# Patient Record
Sex: MALE | URBAN - METROPOLITAN AREA
[De-identification: names, ages, dates, MRNs, and addresses within clinical notes are randomized per-mention and may not be internally consistent; named-entity substitution may affect disease eponyms.]

---

## 2018-02-13 ENCOUNTER — IMPORTED ENCOUNTER (OUTPATIENT)
Dept: URBAN - METROPOLITAN AREA CLINIC 9 | Facility: CLINIC | Age: 68
End: 2018-02-13

## 2019-02-12 ENCOUNTER — IMPORTED ENCOUNTER (OUTPATIENT)
Dept: URBAN - METROPOLITAN AREA CLINIC 9 | Facility: CLINIC | Age: 69
End: 2019-02-12

## 2019-02-18 ENCOUNTER — IMPORTED ENCOUNTER (OUTPATIENT)
Dept: URBAN - METROPOLITAN AREA CLINIC 9 | Facility: CLINIC | Age: 69
End: 2019-02-18

## 2019-02-28 ENCOUNTER — IMPORTED ENCOUNTER (OUTPATIENT)
Dept: URBAN - METROPOLITAN AREA CLINIC 9 | Facility: CLINIC | Age: 69
End: 2019-02-28

## 2019-03-14 ENCOUNTER — IMPORTED ENCOUNTER (OUTPATIENT)
Dept: URBAN - METROPOLITAN AREA CLINIC 9 | Facility: CLINIC | Age: 69
End: 2019-03-14

## 2019-03-14 PROBLEM — Z96.1: Noted: 2019-03-14

## 2020-09-14 NOTE — PATIENT DISCUSSION
Surgery Counseling: I have discussed the option of scheduling surgery versus following, as well as the risks, benefits and alternatives of cataract surgery with the patient. It was explained that the surgery is medically indicated at this time, and it can be performed at the patient's option as delaying will cause no further deterioration, therefore there is no rush and there is no harm in waiting to have surgery. It was also explained that there is no guarantee that removing the cataract will improve their vision. The patient understands and desires to proceed with cataract surgery with the implantation of an intraocular lens to improve vision for ____reading and driving at night_____. I have given the patient the prescribed regimen of the all-in-one drop to use before and after cataract surgery. They have elected to use the all-in-one option of Pred/Gati/Brom(prednisolone acetate,gatifloxacin,and bromfenac. Patient to administer as directed.

## 2020-09-14 NOTE — PATIENT DISCUSSION
CATARACTS, OU - VISUALLY SIGNIFICANT. SCHEDULE _OD_ FIRST THEN LATER IN __OS  DISCUSSED OPTION OF _STANDARD OU__VS ___PANOPTIX OU__. PATIENT UNDERSTANDS AND DESIRES _STANDARD OU_.

## 2020-10-02 NOTE — PATIENT DISCUSSION
Post-Op Instructions: The patient was instructed in the proper use of post-operative eye drop: omni in the surgical eye 3 times per day for 3 weeks, then discontinue. Call back instructions, retinal detachment and endophthalmitis precautions given.

## 2020-10-13 NOTE — PATIENT DISCUSSION
Pre-Op 2nd Eye Counseling: The patient has noticed an improvement in their visual symptoms in the operative eye. The patient complains of decreased vision in the fellow eye when __driving__. It was explained to the patient that the decision to proceed with cataract surgery in the fellow eye is entirely a separate decision from the surgical eye. All of the same risks, benefits and alternatives are reviewed with the patient again. The patient does feel the vision in the non-operative eye is limiting their daily activities and elects to proceed with cataract surgery in the _left_ eye. . I have given the patient the prescribed regimen of  drops to use before and after cataract surgery. Patient to administer as directed.

## 2020-10-13 NOTE — PATIENT DISCUSSION
S/P PE IOL, _od_. DOING WELL. CONTINUE PRED-GATI-BROM IN THE SURGICAL EYE  FOR A TOTAL OF 3 WEEKS USE THEN DISCONTINUE. PATIENT DESIRES _STANDARD__IOL FOR 2ND EYE. SCHEDULE CATARACT SURGERY.

## 2021-10-16 ASSESSMENT — TONOMETRY
OD_IOP_MMHG: 12
OS_IOP_MMHG: 18
OS_IOP_MMHG: 10
OS_IOP_MMHG: 14
OD_IOP_MMHG: 11
OS_IOP_MMHG: 14
OD_IOP_MMHG: 21
OS_IOP_MMHG: 18
OD_IOP_MMHG: 14
OD_IOP_MMHG: 14

## 2021-10-16 ASSESSMENT — KERATOMETRY
OS_AXISANGLE_DEGREES: 180
OS_K1POWER_DIOPTERS: 41.5
OD_K2POWER_DIOPTERS: 41.5
OS_AXISANGLE2_DEGREES: 118
OD_K2POWER_DIOPTERS: 41.25
OS_AXISANGLE2_DEGREES: 90
OS_K2POWER_DIOPTERS: 41.75
OS_AXISANGLE_DEGREES: 28
OD_AXISANGLE_DEGREES: 124
OD_K1POWER_DIOPTERS: 40.75
OD_AXISANGLE2_DEGREES: 39
OS_K1POWER_DIOPTERS: 41.25
OD_AXISANGLE2_DEGREES: 34
OD_AXISANGLE_DEGREES: 129
OS_K2POWER_DIOPTERS: 41.5
OD_K1POWER_DIOPTERS: 40.75

## 2021-10-16 ASSESSMENT — VISUAL ACUITY
OD_SC: 20/70 - SN
OS_CC: 20/50 SN
OS_SC: 20/400 SN
OS_SC: CF 2FT SN
OS_SC: 20/25 ET
OS_SC: CF 2FT SN
OD_CC: 20/30 SN
OD_PH: 20/40 SN
OD_SC: 20/70 SN
OS_PH: 20/60 SN
OD_CC: 20/20 SN
OD_SC: 20/25 ET
OD_SC: 20/60 SN
OD_CC: 20/30 SN
OS_SC: 20/40 SN
OD_PH: 20/30 -2 SN
OS_CC: 20/40 SN

## 2023-11-22 ENCOUNTER — NEW PATIENT (OUTPATIENT)
Dept: URBAN - METROPOLITAN AREA CLINIC 16 | Facility: CLINIC | Age: 73
End: 2023-11-22

## 2023-11-22 DIAGNOSIS — Z96.1: ICD-10-CM

## 2023-11-22 DIAGNOSIS — H26.491: ICD-10-CM

## 2023-11-22 PROCEDURE — 92015 DETERMINE REFRACTIVE STATE: CPT

## 2023-11-22 PROCEDURE — 99204 OFFICE O/P NEW MOD 45 MIN: CPT

## 2023-11-22 ASSESSMENT — KERATOMETRY
OD_AXISANGLE2_DEGREES: 85
OS_AXISANGLE2_DEGREES: 134
OS_K2POWER_DIOPTERS: 41.75
OS_AXISANGLE_DEGREES: 44
OD_AXISANGLE_DEGREES: 175
OD_K2POWER_DIOPTERS: 41.50
OD_K1POWER_DIOPTERS: 41.00
OS_K1POWER_DIOPTERS: 41.25

## 2023-11-22 ASSESSMENT — VISUAL ACUITY
OD_BCVA: 20/25
OD_SC: 20/50
OS_BCVA: 20/20
OS_SC: 20/25
OD_PH: 20/30-2

## 2023-11-22 ASSESSMENT — TONOMETRY
OD_IOP_MMHG: 16
OS_IOP_MMHG: 16

## 2023-12-07 ENCOUNTER — ESTABLISHED PATIENT (OUTPATIENT)
Dept: URBAN - METROPOLITAN AREA CLINIC 14 | Facility: CLINIC | Age: 73
End: 2023-12-07

## 2023-12-07 DIAGNOSIS — H26.491: ICD-10-CM

## 2023-12-07 PROCEDURE — 99214 OFFICE O/P EST MOD 30 MIN: CPT

## 2023-12-07 ASSESSMENT — KERATOMETRY
OD_K2POWER_DIOPTERS: 41.50
OS_K2POWER_DIOPTERS: 41.75
OS_K1POWER_DIOPTERS: 41.25
OD_AXISANGLE_DEGREES: 175
OS_AXISANGLE_DEGREES: 44
OS_AXISANGLE2_DEGREES: 134
OD_K1POWER_DIOPTERS: 41.00
OD_AXISANGLE2_DEGREES: 85

## 2023-12-07 ASSESSMENT — VISUAL ACUITY
OD_PH: 20/30-2
OD_BCVA: 20/25
OS_BCVA: 20/20
OD_SC: 20/50
OS_SC: 20/25

## 2023-12-07 ASSESSMENT — TONOMETRY
OS_IOP_MMHG: 16
OD_IOP_MMHG: 15

## 2023-12-28 ENCOUNTER — FOLLOW UP (OUTPATIENT)
Facility: LOCATION | Age: 73
End: 2023-12-28

## 2023-12-28 ASSESSMENT — VISUAL ACUITY
OS_BCVA: 20/20
OS_SC: 20/40
OD_SC: 20/25
OS_PH: 20/25
OD_BCVA: 20/20

## 2023-12-28 ASSESSMENT — KERATOMETRY
OD_K1POWER_DIOPTERS: 41.00
OS_AXISANGLE_DEGREES: 44
OS_K1POWER_DIOPTERS: 41.25
OS_AXISANGLE2_DEGREES: 134
OD_K2POWER_DIOPTERS: 41.50
OD_AXISANGLE2_DEGREES: 85
OS_K2POWER_DIOPTERS: 41.75
OD_AXISANGLE_DEGREES: 175

## 2023-12-28 ASSESSMENT — TONOMETRY
OS_IOP_MMHG: 15
OD_IOP_MMHG: 14

## 2024-01-02 ENCOUNTER — ESTABLISHED PATIENT (OUTPATIENT)
Facility: LOCATION | Age: 74
End: 2024-01-02

## 2024-01-02 DIAGNOSIS — H26.492: ICD-10-CM

## 2024-01-02 PROCEDURE — 92012 INTRM OPH EXAM EST PATIENT: CPT

## 2024-01-02 ASSESSMENT — TONOMETRY
OD_IOP_MMHG: 19
OS_IOP_MMHG: 17

## 2024-01-02 ASSESSMENT — KERATOMETRY
OS_AXISANGLE_DEGREES: 44
OS_AXISANGLE2_DEGREES: 134
OS_K1POWER_DIOPTERS: 41.25
OD_K2POWER_DIOPTERS: 41.50
OD_AXISANGLE_DEGREES: 175
OD_AXISANGLE2_DEGREES: 85
OS_K2POWER_DIOPTERS: 41.75
OD_K1POWER_DIOPTERS: 41.00

## 2024-01-02 ASSESSMENT — VISUAL ACUITY
OS_PH: 20/30
OD_SC: 20/20
OS_SC: 20/40-1

## 2024-02-14 ENCOUNTER — POST-OP (OUTPATIENT)
Facility: LOCATION | Age: 74
End: 2024-02-14

## 2024-02-14 DIAGNOSIS — Z98.890: ICD-10-CM

## 2024-02-14 PROCEDURE — 99024 POSTOP FOLLOW-UP VISIT: CPT

## 2024-02-14 ASSESSMENT — KERATOMETRY
OS_AXISANGLE_DEGREES: 0
OD_K2POWER_DIOPTERS: 41.50
OS_K2POWER_DIOPTERS: 41.50
OD_AXISANGLE2_DEGREES: 93
OD_K1POWER_DIOPTERS: 41.00
OS_K1POWER_DIOPTERS: 41.50
OD_AXISANGLE_DEGREES: 3
OS_AXISANGLE2_DEGREES: 90

## 2024-02-14 ASSESSMENT — VISUAL ACUITY
OD_SC: 20/20
OS_SC: J1+
OS_SC: 20/20-1
OU_SC: 20/20
OD_SC: J3
OU_SC: J1+

## 2024-02-14 ASSESSMENT — TONOMETRY: OS_IOP_MMHG: 18
